# Patient Record
(demographics unavailable — no encounter records)

---

## 2024-12-04 NOTE — PHYSICAL EXAM
[General Appearance - Alert] : alert [General Appearance - In No Acute Distress] : in no acute distress [Sclera] : the sclera and conjunctiva were normal [Outer Ear] : the ears and nose were normal in appearance [] : the neck was supple [Jugular Venous Distention Increased] : there was no jugular-venous distention [Auscultation Breath Sounds / Voice Sounds] : lungs were clear to auscultation bilaterally [Heart Sounds] : normal S1 and S2 [Heart Sounds Gallop] : no gallops [Edema] : there was no peripheral edema [Bowel Sounds] : normal bowel sounds [Abdomen Soft] : soft [No CVA Tenderness] : no ~M costovertebral angle tenderness [Nail Clubbing] : no clubbing  or cyanosis of the fingernails [No Focal Deficits] : no focal deficits [Oriented To Time, Place, And Person] : oriented to person, place, and time [Affect] : the affect was normal [Mood] : the mood was normal [FreeTextEntry1] : generalized body rash evident

## 2024-12-04 NOTE — ASSESSMENT
[FreeTextEntry1] : 1.CKD stage 3b: Pt with history of CKD stage3. Etiology unclear for now. The differentials include secondary to DM and HTN. No prior labs available to review. Scr was mildly elevated at 1.36 on 3/27/24. Scr was elevated at 1.68 on 7/11/24. UPCR was WNL at 0.1 on labs done on 7/10/24. UA was WNL. Advised to drink ~ 2-3 L of water daily. Check renal panel, UPCR, UA and serum intact PTH today. Advised patient to avoid NSAIDs, RCAs, and other nephrotoxins. Monitor renal function.  2.HTN: Repeat BP reading in acceptable range during office visit. Continue to monitor BP on current BP meds. Low salt diet advised.  Pt advised to bring medications during next visit. Will reach out to PCP regarding previous records.  Follow up pending lab results.   25 minutes: spent in obtaining and reviewing previous records, performing the visit, counseling/coordination of care, creating orders, and documenting the encounter.

## 2024-12-04 NOTE — REVIEW OF SYSTEMS
[Itching] : itching [As Noted in HPI] : as noted in HPI [Fever] : no fever [Chills] : no chills [Feeling Poorly] : not feeling poorly [Dry Eyes] : no dryness of the eyes [Sore Throat] : no sore throat [Chest Pain] : no chest pain [Palpitations] : no palpitations [Lower Ext Edema] : no extremity edema [Cough] : no cough [SOB on Exertion] : no shortness of breath during exertion [Abdominal Pain] : no abdominal pain [Constipation] : no constipation [Diarrhea] : no diarrhea [Dysuria] : no dysuria [Limb Pain] : no limb pain [Dizziness] : no dizziness [Fainting] : no fainting [Anxiety] : no anxiety [Depression] : no depression [Muscle Weakness] : no muscle weakness [FreeTextEntry2] : generalized body rash reported [de-identified] : generalized itching reported

## 2024-12-04 NOTE — HISTORY OF PRESENT ILLNESS
[FreeTextEntry1] : Pt is a 71-year-old Male with history of CKD, DM, HTN and history of CVA ~3 years ago who came to the clinic for follow up visit. Pt accompanied by his brother who speaks English and history obtain from him as per pts request. Pt was last seen for follow up visit on 7/10/24.   Per brother, Pt was told that he has some kidney damage (CKD stage3) ~2 years ago. Due to insurance issues, Pt was not able to see any doctor. No prior labs available to review. Pts brother told today that the pt has history of DM, HTN and has a stroke ~ 3 years ago. No prior records available to review, Scr was mildly elevated at 1.36 on 3/27/24. Scr was elevated at 1.68 on 7/11/24. UPCR was WNL at 0.1 on labs done on 7/10/24. UA was NWL. Pt. Denies history of kidney stones in the past. No history of kidney disease in family. Denies recent use of NSAIDs/ motrin recently.   Pt. currently feels well. Pt. denies any chest pain, SOB, nausea, dysuria, weakness. Pt says he is trying to improve his oral fluid intake. Per his brother, Pt is taking medications for DM and HTN but doesnt remember the name.

## 2025-06-09 NOTE — ASSESSMENT
[FreeTextEntry1] : 1.CKD stage 2/3a: Pt with history of CKD stage3.  The differentials include secondary to DM and HTN. No prior labs available to review. Scr was mildly elevated at 1.36 on 3/27/24. Scr was elevated at 1.68 on 7/11/24. Last Scr was WNL at 1.28 on 12/4/24. UPCR was WNL at 0.1 on labs done on 7/10/24.  UA was WNL. Advised to drink ~ 2-3 L of water daily. Check renal panel, UPCR, and serum intact PTH today. Advised patient to avoid NSAIDs, RCAs, and other nephrotoxins. Monitor renal function.  2.HTN: Repeat BP reading in acceptable range during office visit. Continue to monitor BP on current BP meds. Low salt diet advised.  3.DM: Healthy lifestyle and eating habits advised. Advised to follow up with PCP for further management of DM.  Follow up pending lab results.   30 minutes: spent in obtaining and reviewing previous records, performing the visit, counseling/coordination of care, creating orders, and documenting the encounter.

## 2025-06-09 NOTE — HISTORY OF PRESENT ILLNESS
[FreeTextEntry1] : Pt is a 72-year-old Male with history of CKD, DM, HTN and history of CVA ~3 years ago who came to the clinic for follow up visit. Pt accompanied by his brother who speaks English and history obtain from him as per pts request. Pt was last seen for follow up visit on 12/4/24.   Per brother, Pt was told that he has some kidney damage (CKD stage3) ~2 years ago. Due to insurance issues, Pt was not able to see any doctor. No prior labs available to review. Per Pts brother, pt has history of DM, HTN and has a stroke ~ 3 years ago. Scr was mildly elevated at 1.36 on 3/27/24. Scr was elevated at 1.68 on 7/11/24. Last Scr was WNL at 1.28 on 12/4/24. UPCR was WNL at 0.1 on labs done on 7/10/24. UA was NWL. Pt. Denies history of kidney stones in the past. No history of kidney disease in family. Denies recent use of NSAIDs/ motrin recently.   Pt. currently feels well. Pt. denies any chest pain, SOB, nausea, dysuria, weakness. Pt says he is trying to improve his oral fluid intake. Per pts brother, Pt is losing weight and is trying to improve his appetite.

## 2025-06-09 NOTE — REVIEW OF SYSTEMS
[Itching] : itching [As Noted in HPI] : as noted in HPI [Fever] : no fever [Chills] : no chills [Feeling Poorly] : not feeling poorly [Dry Eyes] : no dryness of the eyes [Sore Throat] : no sore throat [Palpitations] : no palpitations [Chest Pain] : no chest pain [Lower Ext Edema] : no extremity edema [Cough] : no cough [SOB on Exertion] : no shortness of breath during exertion [Abdominal Pain] : no abdominal pain [Constipation] : no constipation [Dysuria] : no dysuria [Diarrhea] : no diarrhea [Limb Pain] : no limb pain [Dizziness] : no dizziness [Fainting] : no fainting [Anxiety] : no anxiety [Depression] : no depression [Muscle Weakness] : no muscle weakness [FreeTextEntry2] : generalized body rash reported [de-identified] : generalized itching reported

## 2025-06-09 NOTE — PHYSICAL EXAM
[General Appearance - Alert] : alert [General Appearance - In No Acute Distress] : in no acute distress [Sclera] : the sclera and conjunctiva were normal [] : the neck was supple [Outer Ear] : the ears and nose were normal in appearance [Jugular Venous Distention Increased] : there was no jugular-venous distention [Auscultation Breath Sounds / Voice Sounds] : lungs were clear to auscultation bilaterally [Heart Sounds] : normal S1 and S2 [Heart Sounds Gallop] : no gallops [Edema] : there was no peripheral edema [Bowel Sounds] : normal bowel sounds [No CVA Tenderness] : no ~M costovertebral angle tenderness [Abdomen Soft] : soft [Nail Clubbing] : no clubbing  or cyanosis of the fingernails [No Focal Deficits] : no focal deficits [Mood] : the mood was normal [FreeTextEntry1] : generalized body rash evident

## 2025-07-01 NOTE — PHYSICAL EXAM
[General Appearance - Alert] : alert [General Appearance - In No Acute Distress] : in no acute distress [Sclera] : the sclera and conjunctiva were normal [Outer Ear] : the ears and nose were normal in appearance [] : the neck was supple [Jugular Venous Distention Increased] : there was no jugular-venous distention [Auscultation Breath Sounds / Voice Sounds] : lungs were clear to auscultation bilaterally [Heart Sounds] : normal S1 and S2 [Heart Sounds Gallop] : no gallops [Edema] : there was no peripheral edema [Bowel Sounds] : normal bowel sounds [Abdomen Soft] : soft [No CVA Tenderness] : no ~M costovertebral angle tenderness [Nail Clubbing] : no clubbing  or cyanosis of the fingernails [No Focal Deficits] : no focal deficits [Mood] : the mood was normal [FreeTextEntry1] : generalized body rash evident sent home w/ family/friend

## 2025-07-01 NOTE — ASSESSMENT
[FreeTextEntry1] : 1.LADONNA on CKD stage 3: Pt with history of CKD stage3.  Likely in the setting of DM and HTN.  No prior labs available to review. Scr was mildly elevated at 1.36 on 3/27/24. Scr was elevated at 1.68 on 7/11/24. Subsequently, Scr was WNL at 1.28 on 12/4/24. UPCR was WNL at 0.1 on labs done on 7/10/24.  UA was WNL. Recent Scr is elevated at 2.37 (6/9/25). Recent rise in Scr likely from poor oral intake. Will hold off ACE inhibitor therapy and HCTZ for now. Advised to drink ~ 2-3 L of water daily. Check CBC, renal panel, UPCR, and serum intact PTH today. Advised patient to avoid NSAIDs, RCAs, and other nephrotoxins. Monitor renal function.  2.HTN: Repeat BP reading in acceptable range during office visit. Continue to monitor BP on current BP meds. Low salt diet advised.  3.DM: Healthy lifestyle and eating habits advised. Advised to follow up with PCP for further management of DM.  Follow up pending lab results.   40 minutes: spent in obtaining and reviewing previous records, performing the visit, counseling/coordination of care, creating orders, and documenting the encounter.

## 2025-07-01 NOTE — HISTORY OF PRESENT ILLNESS
[FreeTextEntry1] : Pt is a 72-year-old Male with history of CKD, DM, HTN and history of CVA ~3 years ago who came to the clinic for follow up visit. Pt accompanied by his brother who speaks English and history obtain from him as per pts request. Pt was last seen for follow up visit for CKD stage3 on 6/9/25.   Kidney history: Per brother, Pt was told that he has some kidney damage (CKD stage3) ~2 years ago. Due to insurance issues, Pt was not able to see any doctor. Pt established care with us on 3/20/24. No prior labs available to review. Per Pts brother, pt has history of DM, HTN and has a stroke ~ 3 years ago. Scr was mildly elevated at 1.36 on 3/27/24. Scr was elevated at 1.68 on 7/11/24. Subsequently, Scr was WNL at 1.28 on 12/4/24. UPCR was WNL at 0.1 on labs done on 7/10/24. UA was WNL. Pt. Denies history of kidney stones in the past. No history of kidney disease in family. Denies recent use of NSAIDs/ motrin recently. Last Scr was elevated at 2.37 on 6/9/25. Lisinopril-HCTZ was discontinued.   Pt. currently feels well. Pt. denies any chest pain, SOB, nausea, dysuria, weakness. Pt says he is trying to improve his oral fluid intake. Per pts brother, Pt has gained ~4 lbs since last visit. His appetite has improved.

## 2025-07-01 NOTE — REVIEW OF SYSTEMS
[Itching] : itching [As Noted in HPI] : as noted in HPI [Fever] : no fever [Chills] : no chills [Feeling Poorly] : not feeling poorly [Dry Eyes] : no dryness of the eyes [Sore Throat] : no sore throat [Chest Pain] : no chest pain [Palpitations] : no palpitations [Lower Ext Edema] : no extremity edema [Cough] : no cough [SOB on Exertion] : no shortness of breath during exertion [Abdominal Pain] : no abdominal pain [Constipation] : no constipation [Diarrhea] : no diarrhea [Dysuria] : no dysuria [Limb Pain] : no limb pain [Dizziness] : no dizziness [Fainting] : no fainting [Anxiety] : no anxiety [Depression] : no depression [Muscle Weakness] : no muscle weakness [FreeTextEntry2] : generalized body rash reported [de-identified] : generalized itching reported